# Patient Record
(demographics unavailable — no encounter records)

---

## 2024-10-21 NOTE — HISTORY OF PRESENT ILLNESS
[FreeTextEntry1] : 21-year-old  last menstrual cycle started on 2021 till now presents for GYN evaluation.  She has never had Pap smear before and she has never been sexually active.  She has been on progesterone to regulate her menstrual cycle.  No headaches epigastric pain or blurry vision.

## 2024-10-21 NOTE — DISCUSSION/SUMMARY
[FreeTextEntry1] : 21-year-old  presently on progesterone to regulate her menstrual cycle.  No pain bleeding or discharge.  She is not sexually active.  Physical examination is normal.  Pap GC chlamydia sent.  Return to the office in 6 months for follow-up.  Gynecologic hygiene discussed.